# Patient Record
Sex: FEMALE | Race: BLACK OR AFRICAN AMERICAN | Employment: UNEMPLOYED | URBAN - METROPOLITAN AREA
[De-identification: names, ages, dates, MRNs, and addresses within clinical notes are randomized per-mention and may not be internally consistent; named-entity substitution may affect disease eponyms.]

---

## 2020-12-18 ENCOUNTER — HOSPITAL ENCOUNTER (OUTPATIENT)
Facility: HOSPITAL | Age: 25
Setting detail: OBSERVATION
Discharge: HOME | End: 2020-12-18
Attending: OBSTETRICS & GYNECOLOGY | Admitting: OBSTETRICS & GYNECOLOGY
Payer: MEDICAID

## 2020-12-18 VITALS — DIASTOLIC BLOOD PRESSURE: 72 MMHG | SYSTOLIC BLOOD PRESSURE: 118 MMHG | HEART RATE: 91 BPM

## 2020-12-18 PROBLEM — Z34.90 PREGNANCY: Status: ACTIVE | Noted: 2020-12-18

## 2020-12-18 PROCEDURE — 59025 FETAL NON-STRESS TEST: CPT | Performed by: OBSTETRICS & GYNECOLOGY

## 2020-12-19 NOTE — PROGRESS NOTES
Pt is a 22year old female admitted to TR1/TR1-A. Patient presents with:  R/o Labor     Pt is  Unknown intra-uterine pregnancy. History obtained, consents signed. Oriented to room, staff, and plan of care.

## 2020-12-19 NOTE — PROGRESS NOTES
OB Triage Note    Physician Evaluation      NST Interpretation: Reactive    Disposition:   Discharged    Comments:    23 y/o  at 38w3d who presents with c/o contractions while shopping at the mall.  Pt has care at [facility]. On initial

## 2023-02-02 LAB
AMB EXT HEMATOCRIT: 33.6
AMB EXT HEMOGLOBIN: 11.3
AMB EXT PLATELETS: 319
AMB EXT TREPONEMAL ANTIBODIES: NON REACTIVE
ANTIBODY SCREEN OB: NEGATIVE
HEPATITIS B SURFACE ANTIGEN OB: NEGATIVE
HIV RESULT OB: NEGATIVE
RH FACTOR OB: POSITIVE

## 2023-03-09 LAB — AMB EXT GLUCOSE 1HR OB: 146

## 2023-04-17 ENCOUNTER — HOSPITAL ENCOUNTER (OUTPATIENT)
Facility: HOSPITAL | Age: 28
Discharge: HOME | End: 2023-04-17
Attending: OBSTETRICS & GYNECOLOGY | Admitting: OBSTETRICS & GYNECOLOGY
Payer: MEDICAID

## 2023-04-17 VITALS — HEART RATE: 84 BPM | SYSTOLIC BLOOD PRESSURE: 82 MMHG | TEMPERATURE: 99 F | DIASTOLIC BLOOD PRESSURE: 50 MMHG

## 2023-04-17 LAB
ALBUMIN SERPL-MCNC: 2.8 G/DL (ref 3.4–5)
ALBUMIN/GLOB SERPL: 0.6 {RATIO} (ref 1–2)
ALP LIVER SERPL-CCNC: 126 U/L
ALT SERPL-CCNC: 12 U/L
ANION GAP SERPL CALC-SCNC: 4 MMOL/L (ref 0–18)
AST SERPL-CCNC: 10 U/L (ref 15–37)
BASOPHILS # BLD AUTO: 0.02 X10(3) UL (ref 0–0.2)
BASOPHILS NFR BLD AUTO: 0.2 %
BILIRUB SERPL-MCNC: 0.8 MG/DL (ref 0.1–2)
BUN BLD-MCNC: 5 MG/DL (ref 7–18)
BUN/CREAT SERPL: 10.2 (ref 10–20)
CALCIUM BLD-MCNC: 8.7 MG/DL (ref 8.5–10.1)
CHLORIDE SERPL-SCNC: 110 MMOL/L (ref 98–112)
CO2 SERPL-SCNC: 24 MMOL/L (ref 21–32)
CREAT BLD-MCNC: 0.49 MG/DL
CREAT UR-SCNC: 233 MG/DL
DEPRECATED RDW RBC AUTO: 45 FL (ref 35.1–46.3)
EOSINOPHIL # BLD AUTO: 0.05 X10(3) UL (ref 0–0.7)
EOSINOPHIL NFR BLD AUTO: 0.5 %
ERYTHROCYTE [DISTWIDTH] IN BLOOD BY AUTOMATED COUNT: 16.9 % (ref 11–15)
FASTING STATUS PATIENT QL REPORTED: NO
GFR SERPLBLD BASED ON 1.73 SQ M-ARVRAT: 132 ML/MIN/1.73M2 (ref 60–?)
GLOBULIN PLAS-MCNC: 4.5 G/DL (ref 2.8–4.4)
GLUCOSE BLD-MCNC: 79 MG/DL (ref 70–99)
HCT VFR BLD AUTO: 29.3 %
HGB BLD-MCNC: 9.7 G/DL
IMM GRANULOCYTES # BLD AUTO: 0.06 X10(3) UL (ref 0–1)
IMM GRANULOCYTES NFR BLD: 0.6 %
LYMPHOCYTES # BLD AUTO: 1.31 X10(3) UL (ref 1–4)
LYMPHOCYTES NFR BLD AUTO: 13.2 %
MCH RBC QN AUTO: 24.4 PG (ref 26–34)
MCHC RBC AUTO-ENTMCNC: 33.1 G/DL (ref 31–37)
MCV RBC AUTO: 73.6 FL
MONOCYTES # BLD AUTO: 0.92 X10(3) UL (ref 0.1–1)
MONOCYTES NFR BLD AUTO: 9.3 %
NEUTROPHILS # BLD AUTO: 7.57 X10 (3) UL (ref 1.5–7.7)
NEUTROPHILS # BLD AUTO: 7.57 X10(3) UL (ref 1.5–7.7)
NEUTROPHILS NFR BLD AUTO: 76.2 %
OSMOLALITY SERPL CALC.SUM OF ELEC: 282 MOSM/KG (ref 275–295)
PLATELET # BLD AUTO: 309 10(3)UL (ref 150–450)
POTASSIUM SERPL-SCNC: 3.6 MMOL/L (ref 3.5–5.1)
PROT SERPL-MCNC: 7.3 G/DL (ref 6.4–8.2)
PROT UR-MCNC: 80.1 MG/DL
PROT/CREAT UR-RTO: 0.34
RBC # BLD AUTO: 3.98 X10(6)UL
SODIUM SERPL-SCNC: 138 MMOL/L (ref 136–145)
WBC # BLD AUTO: 9.9 X10(3) UL (ref 4–11)

## 2023-04-17 PROCEDURE — 99203 OFFICE O/P NEW LOW 30 MIN: CPT | Performed by: OBSTETRICS & GYNECOLOGY

## 2023-04-17 RX ORDER — BUTALBITAL, ACETAMINOPHEN AND CAFFEINE 50; 325; 40 MG/1; MG/1; MG/1
1 TABLET ORAL ONCE
Status: COMPLETED | OUTPATIENT
Start: 2023-04-17 | End: 2023-04-17

## 2023-04-17 RX ORDER — CHOLECALCIFEROL (VITAMIN D3) 25 MCG
1 TABLET,CHEWABLE ORAL DAILY
COMMUNITY

## 2023-04-17 RX ORDER — MELATONIN
325
Qty: 60 TABLET | Refills: 0 | Status: SHIPPED | OUTPATIENT
Start: 2023-04-17

## 2023-04-17 NOTE — DISCHARGE INSTRUCTIONS
Follow up this week with [facility]  Start a once daily tablet of 325mg Iron (see prescription)     Dr. Christi Meigs  Phone: [address]  Phone: [phone]

## 2023-04-17 NOTE — H&P
[facility] Patient Status:  Outpatient    1995 MRN [id number]   Location [address] [facility] Attending Francisco Fuentes MD   Hosp Day # 0 PCP No primary care provider on file. Date of Admission:  2023    SUBJECTIVE:    Luis Eduardo Villasenor is a 32year old  at 27w7d with an SHENG of 2023, by Last Menstrual Period who presented with a headache. Per patient she has had a headache for the last two months. She states in the last two days her headache is much worse. She was seen at [facility] ED months ago where she states she was told she was preeclamptic. She receives care at [facility] but has not been seen in a few weeks. She denies history of migraines. Denies vision changes. She has been taking tylenol which has not been helping today. She states she tries to stay hydrated (but urine does look concentrated). She also notes a small amount of blood when wiping early this morning. She denies contractions, LOF and recent intercourse. She does feel good fetal movement. Pt denies SOB, CP, Leg pain, back pain, N/V    Problem List: Patient Active Problem List    Pregnancy      Obstetric History:   OB History    Para Term  AB Living   4 1 1   1     SAB IAB Ectopic Multiple Live Births     1            # Outcome Date GA Lbr Joseph/2nd Weight Sex Delivery Anes PTL Lv   4 Current            3 Term            2 IAB            1               Past Medical History: History reviewed. No pertinent past medical history. Past Surgical History:   Family History: No family history on file. Social History: Social History    Tobacco Use      Smoking status: Never      Smokeless tobacco: Never    Vaping Use      Vaping status: Never Used    Alcohol use: Not Currently      Home Meds: No medications prior to admission.     Allergies: Not on File    OBJECTIVE:    Pulse:  [] 84  BP: ()/(46-73) 82/50    General: Alert and Oriented  Abdomen: Soft, non-tender  BLE: trace edema and nontender    SSE: no lesions, no abnormal discharge, no blood in vaginal vault  Cervixcl/th/hi       130 BPM, Fetal heart variability: moderate and reactive  Decelerations: No  Contractions: No    Lab Review:  Results for orders placed or performed during the hospital encounter of 35/95/39   Comp Metabolic Panel (14)   Result Value Ref Range    Glucose 79 70 - 99 mg/dL    Sodium 138 136 - 145 mmol/L    Potassium 3.6 3.5 - 5.1 mmol/L    Chloride 110 98 - 112 mmol/L    CO2 24.0 21.0 - 32.0 mmol/L    Anion Gap 4 0 - 18 mmol/L    BUN 5 (L) 7 - 18 mg/dL    Creatinine 0.49 (L) 0.55 - 1.02 mg/dL    BUN/CREA Ratio 10.2 10.0 - 20.0    Calcium, Total 8.7 8.5 - 10.1 mg/dL    Calculated Osmolality 282 275 - 295 mOsm/kg    eGFR-Cr 132 >=60 mL/min/1.73m2    ALT 12 (L) 13 - 56 U/L    AST 10 (L) 15 - 37 U/L    Alkaline Phosphatase 126 (H) 37 - 98 U/L    Bilirubin, Total 0.8 0.1 - 2.0 mg/dL    Total Protein 7.3 6.4 - 8.2 g/dL    Albumin 2.8 (L) 3.4 - 5.0 g/dL    Globulin  4.5 (H) 2.8 - 4.4 g/dL    A/G Ratio 0.6 (L) 1.0 - 2.0    Patient Fasting for CMP?  No    Protein/Creatinine Ratio, Urine Random   Result Value Ref Range    Total Protein Urine Random 80.1 mg/dL    Creatinine Ur Random 233.00 mg/dL    Urine Protein/Creatinine Ratio, Random 0.34    CBC W/ DIFFERENTIAL   Result Value Ref Range    WBC 9.9 4.0 - 11.0 x10(3) uL    RBC 3.98 3.80 - 5.30 x10(6)uL    HGB 9.7 (L) 12.0 - 16.0 g/dL    HCT 29.3 (L) 35.0 - 48.0 %    MCV 73.6 (L) 80.0 - 100.0 fL    MCH 24.4 (L) 26.0 - 34.0 pg    MCHC 33.1 31.0 - 37.0 g/dL    RDW-SD 45.0 35.1 - 46.3 fL    RDW 16.9 (H) 11.0 - 15.0 %    .0 150.0 - 450.0 10(3)uL    Neutrophil Absolute Prelim 7.57 1.50 - 7.70 x10 (3) uL    Neutrophil Absolute 7.57 1.50 - 7.70 x10(3) uL    Lymphocyte Absolute 1.31 1.00 - 4.00 x10(3) uL    Monocyte Absolute 0.92 0.10 - 1.00 x10(3) uL    Eosinophil Absolute 0.05 0.00 - 0.70 x10(3) uL    Basophil Absolute 0.02 0.00 - 0.20 x10(3) uL    Immature Granulocyte Absolute 0.06 0.00 - 1.00 x10(3) uL    Neutrophil % 76.2 %    Lymphocyte % 13.2 %    Monocyte % 9.3 %    Eosinophil % 0.5 %    Basophil % 0.2 %    Immature Granulocyte % 0.6 %        ASSESSMENT:    Patient is a  at 35w5d      PLAN:    1. Headache  1. Fioricet given to patient and headache has improved    2. IVFs   3. gHTN labs done showing anemia (Rx for iron) and elevated P:C  1. BP normal here today  2. Reviewed need for prenatal care this week with [facility]. Reviewed can see Dr. Max Dhaliwal or Midwives for transfer of care but needs to do it immediately   3. Reviewed need for iron  4.  FHTs reassuring     Eva Almanza MD  2023  4:06 PM

## 2023-04-17 NOTE — PROGRESS NOTES
Pt is a 32year old female admitted to TR1/TR1-A. Patient presents with:  Headache: Pt stated, \"I've been having a headache for 3 days and tylenol isn't working. I'm preclamptic. \" Pt states that she doesn't come to [place] for [facility]. Pt is  Unknown intra-uterine pregnancy. History obtained, consents signed. Oriented to room, staff, and plan of care.